# Patient Record
Sex: FEMALE | URBAN - METROPOLITAN AREA
[De-identification: names, ages, dates, MRNs, and addresses within clinical notes are randomized per-mention and may not be internally consistent; named-entity substitution may affect disease eponyms.]

---

## 2020-07-28 ENCOUNTER — OFFICE VISIT (OUTPATIENT)
Dept: URBAN - METROPOLITAN AREA CLINIC 23 | Facility: CLINIC | Age: 60
End: 2020-07-28
Payer: MEDICAID

## 2020-07-28 DIAGNOSIS — K21.9 CHRONIC GERD: ICD-10-CM

## 2020-07-28 DIAGNOSIS — R11.2 NAUSEA AND VOMITING IN ADULT: ICD-10-CM

## 2020-07-28 PROCEDURE — 99204 OFFICE O/P NEW MOD 45 MIN: CPT | Performed by: INTERNAL MEDICINE

## 2020-07-28 PROCEDURE — G8427 DOCREV CUR MEDS BY ELIG CLIN: HCPCS | Performed by: INTERNAL MEDICINE

## 2020-07-28 PROCEDURE — G9622 NO UNHEAL ETOH USER: HCPCS | Performed by: INTERNAL MEDICINE

## 2020-07-28 PROCEDURE — 3017F COLORECTAL CA SCREEN DOC REV: CPT | Performed by: INTERNAL MEDICINE

## 2020-07-28 PROCEDURE — 1036F TOBACCO NON-USER: CPT | Performed by: INTERNAL MEDICINE

## 2020-07-28 PROCEDURE — G8420 CALC BMI NORM PARAMETERS: HCPCS | Performed by: INTERNAL MEDICINE

## 2020-07-28 PROCEDURE — 99244 OFF/OP CNSLTJ NEW/EST MOD 40: CPT | Performed by: INTERNAL MEDICINE

## 2020-07-28 PROCEDURE — G9903 PT SCRN TBCO ID AS NON USER: HCPCS | Performed by: INTERNAL MEDICINE

## 2020-07-28 NOTE — HPI-TODAY'S VISIT:
59-year-old  female with longstanding history of GERD presents with 3 weeks history of nausea and vomiting.  It comes and goes.  Associated with diarrhea and cramping pain sometimes.  She sent in stool sample to pcp today that was very dark color.  She went to the ER 4 days ago, prescribed Zofran and Levsin which helped.  Currently she has only minimal symptoms.  She takes omeprazole daily for chronic GERD.  Denies dysphagia.  Reflux has been pretty well controlled.  No previous EGD.  Colonoscopy about 8 years ago, she was told a couple of polyps were removed.  Denies frequent NSAID use.  She takes acetaminophen for headache.  No gastric cancer or esophageal cancer or colon cancer in her family.

## 2020-07-28 NOTE — PREVIOUS WORKUP REVIEWED
: Reviewed the referring provider's note.ENDOSCOPIES:LABS: -Labs 7/17/2020: WBC 7.7, hemoglobin 13.8, platelets 201, BUN 12, creatinine 1.0, total bilirubin 0.8, alkaline phosphatase 88, AST 32, ALT 34, Covid negative.IMAGES:-Chest x-ray 7/17/2020: no acute cardiopulmonary process.

## 2020-08-05 ENCOUNTER — TELEPHONE ENCOUNTER (OUTPATIENT)
Dept: URBAN - METROPOLITAN AREA CLINIC 23 | Facility: CLINIC | Age: 60
End: 2020-08-05

## 2020-08-06 ENCOUNTER — TELEPHONE ENCOUNTER (OUTPATIENT)
Dept: URBAN - METROPOLITAN AREA CLINIC 23 | Facility: CLINIC | Age: 60
End: 2020-08-06

## 2020-08-06 ENCOUNTER — OFFICE VISIT (OUTPATIENT)
Dept: URBAN - METROPOLITAN AREA LAB 3 | Facility: LAB | Age: 60
End: 2020-08-06
Payer: MEDICAID

## 2020-08-06 DIAGNOSIS — K22.2 ACQUIRED ESOPHAGEAL RING: ICD-10-CM

## 2020-08-06 DIAGNOSIS — K31.89 ACQUIRED DEFORMITY OF PYLORUS: ICD-10-CM

## 2020-08-06 DIAGNOSIS — R11.2 AMPHOTERICIN-INDUCED NAUSEA AND VOMITING: ICD-10-CM

## 2020-08-06 DIAGNOSIS — K22.8 COLUMNAR-LINED ESOPHAGUS: ICD-10-CM

## 2020-08-06 PROCEDURE — 43239 EGD BIOPSY SINGLE/MULTIPLE: CPT | Performed by: INTERNAL MEDICINE

## 2020-08-06 RX ORDER — OMEPRAZOLE 40 MG/1
1 CAPSULE 30 MINUTES BEFORE MEAL CAPSULE, DELAYED RELEASE ORAL ONCE A DAY
Qty: 90 TABS | Refills: 1 | OUTPATIENT
Start: 2020-08-06

## 2020-08-11 ENCOUNTER — TELEPHONE ENCOUNTER (OUTPATIENT)
Dept: URBAN - METROPOLITAN AREA CLINIC 23 | Facility: CLINIC | Age: 60
End: 2020-08-11

## 2020-08-11 RX ORDER — OMEPRAZOLE 40 MG/1
1 CAPSULE 30 MINUTES BEFORE MEAL CAPSULE, DELAYED RELEASE ORAL ONCE A DAY
Qty: 90 TABS | Refills: 1
Start: 2020-08-06

## 2022-05-13 ENCOUNTER — OFFICE VISIT (OUTPATIENT)
Dept: URBAN - METROPOLITAN AREA CLINIC 23 | Facility: CLINIC | Age: 62
End: 2022-05-13
Payer: MEDICAID

## 2022-05-13 ENCOUNTER — LAB OUTSIDE AN ENCOUNTER (OUTPATIENT)
Dept: URBAN - METROPOLITAN AREA CLINIC 23 | Facility: CLINIC | Age: 62
End: 2022-05-13

## 2022-05-13 VITALS
HEART RATE: 73 BPM | WEIGHT: 150.8 LBS | TEMPERATURE: 97.3 F | DIASTOLIC BLOOD PRESSURE: 69 MMHG | HEIGHT: 64 IN | SYSTOLIC BLOOD PRESSURE: 108 MMHG | BODY MASS INDEX: 25.74 KG/M2

## 2022-05-13 DIAGNOSIS — Z12.11 COLON CANCER SCREENING: ICD-10-CM

## 2022-05-13 DIAGNOSIS — Z86.010 COLON POLYP HISTORY: ICD-10-CM

## 2022-05-13 PROCEDURE — 99203 OFFICE O/P NEW LOW 30 MIN: CPT | Performed by: INTERNAL MEDICINE

## 2022-05-13 PROCEDURE — G8427 DOCREV CUR MEDS BY ELIG CLIN: HCPCS | Performed by: INTERNAL MEDICINE

## 2022-05-13 PROCEDURE — 993 AGA: Performed by: INTERNAL MEDICINE

## 2022-05-13 PROCEDURE — 3017F COLORECTAL CA SCREEN DOC REV: CPT | Performed by: INTERNAL MEDICINE

## 2022-05-13 PROCEDURE — G9903 PT SCRN TBCO ID AS NON USER: HCPCS | Performed by: INTERNAL MEDICINE

## 2022-05-13 PROCEDURE — 99214 OFFICE O/P EST MOD 30 MIN: CPT | Performed by: INTERNAL MEDICINE

## 2022-05-13 PROCEDURE — G8420 CALC BMI NORM PARAMETERS: HCPCS | Performed by: INTERNAL MEDICINE

## 2022-05-13 RX ORDER — OMEGA-3 FATTY ACIDS/FISH OIL 300-1000MG
1 CAPSULE CAPSULE ORAL ONCE A DAY
Status: ACTIVE | COMMUNITY

## 2022-05-13 RX ORDER — LISINOPRIL 5 MG/1
1 TABLET TABLET ORAL ONCE A DAY
Status: ACTIVE | COMMUNITY

## 2022-05-13 RX ORDER — OMEPRAZOLE 40 MG/1
1 CAPSULE 30 MINUTES BEFORE MEAL CAPSULE, DELAYED RELEASE ORAL ONCE A DAY
Qty: 90 TABS | Refills: 1 | Status: ACTIVE | COMMUNITY
Start: 2020-08-06

## 2022-05-13 RX ORDER — FENOFIBRATE 134 MG/1
1 CAPSULE WITH A MEAL CAPSULE ORAL ONCE A DAY
Status: ACTIVE | COMMUNITY

## 2022-05-13 RX ORDER — POLYETHYLENE GLYCOL 3350, SODIUM SULFATE, SODIUM CHLORIDE, POTASSIUM CHLORIDE, ASCORBIC ACID, SODIUM ASCORBATE 140-9-5.2G
AS DIRECTED KIT ORAL ONCE
Qty: 1 KIT | Refills: 0 | OUTPATIENT
Start: 2022-05-13 | End: 2022-05-14

## 2022-05-13 RX ORDER — DEXTROAMPHETAMINE SACCHARATE, AMPHETAMINE ASPARTATE, DEXTROAMPHETAMINE SULFATE, AND AMPHETAMINE SULFATE 5; 5; 5; 5 MG/1; MG/1; MG/1; MG/1
1 TABLET TABLET ORAL TWICE A DAY
Status: ACTIVE | COMMUNITY

## 2022-05-13 RX ORDER — ZIPRASIDONE HYDROCHLORIDE 60 MG/1
1 CAPSULE WITH FOOD CAPSULE ORAL TWICE A DAY
Status: ACTIVE | COMMUNITY

## 2022-05-13 NOTE — PREVIOUS WORKUP REVIEWED
: ENDOSCOPIES: -EGD 8/6/2020: Congested mucosa at the GE junction.  Shallow Schatzki's ring.  Otherwise normal EGD.*Pathology: Duodenum-normal.  Gastric random-reactive gastropathy, negative for H. pylori.  GE junction-chronic inflammation, negative for intestinal metaplasia.LABS:  -Labs 11/26/2019: WBC 6.1, hemoglobin 14.7, platelet 277, hemoglobin A1c 5.4. IMAGES:

## 2022-05-14 ENCOUNTER — DASHBOARD ENCOUNTERS (OUTPATIENT)
Age: 62
End: 2022-05-14

## 2022-05-17 ENCOUNTER — TELEPHONE ENCOUNTER (OUTPATIENT)
Dept: URBAN - METROPOLITAN AREA CLINIC 23 | Facility: CLINIC | Age: 62
End: 2022-05-17

## 2022-05-17 RX ORDER — POLYETHYLENE GLYCOL-3350 AND ELECTROLYTES WITH FLAVOR PACK 240; 5.84; 2.98; 6.72; 22.72 G/278.26G; G/278.26G; G/278.26G; G/278.26G; G/278.26G
AS DIRECTED POWDER, FOR SOLUTION ORAL ONCE
Qty: 1 KIT | Refills: 0 | OUTPATIENT
Start: 2022-05-17 | End: 2022-05-18

## 2022-06-01 PROBLEM — 428283002 HISTORY OF POLYP OF COLON: Status: ACTIVE | Noted: 2022-05-13

## 2022-06-14 ENCOUNTER — OFFICE VISIT (OUTPATIENT)
Dept: URBAN - METROPOLITAN AREA SURGERY CENTER 8 | Facility: SURGERY CENTER | Age: 62
End: 2022-06-14
Payer: MEDICAID

## 2022-06-14 DIAGNOSIS — Z86.010 ADENOMAS PERSONAL HISTORY OF COLONIC POLYPS: ICD-10-CM

## 2022-06-14 PROCEDURE — 45378 DIAGNOSTIC COLONOSCOPY: CPT | Performed by: INTERNAL MEDICINE

## 2022-06-14 PROCEDURE — G8907 PT DOC NO EVENTS ON DISCHARG: HCPCS | Performed by: INTERNAL MEDICINE

## 2022-06-14 RX ORDER — OMEPRAZOLE 40 MG/1
1 CAPSULE 30 MINUTES BEFORE MEAL CAPSULE, DELAYED RELEASE ORAL ONCE A DAY
Qty: 90 TABS | Refills: 1 | Status: ACTIVE | COMMUNITY
Start: 2020-08-06

## 2022-06-14 RX ORDER — FENOFIBRATE 134 MG/1
1 CAPSULE WITH A MEAL CAPSULE ORAL ONCE A DAY
Status: ACTIVE | COMMUNITY

## 2022-06-14 RX ORDER — LISINOPRIL 5 MG/1
1 TABLET TABLET ORAL ONCE A DAY
Status: ACTIVE | COMMUNITY

## 2022-06-14 RX ORDER — DEXTROAMPHETAMINE SACCHARATE, AMPHETAMINE ASPARTATE, DEXTROAMPHETAMINE SULFATE, AND AMPHETAMINE SULFATE 5; 5; 5; 5 MG/1; MG/1; MG/1; MG/1
1 TABLET TABLET ORAL TWICE A DAY
Status: ACTIVE | COMMUNITY

## 2022-06-14 RX ORDER — ZIPRASIDONE HYDROCHLORIDE 60 MG/1
1 CAPSULE WITH FOOD CAPSULE ORAL TWICE A DAY
Status: ACTIVE | COMMUNITY

## 2022-06-14 RX ORDER — OMEGA-3 FATTY ACIDS/FISH OIL 300-1000MG
1 CAPSULE CAPSULE ORAL ONCE A DAY
Status: ACTIVE | COMMUNITY